# Patient Record
Sex: FEMALE | Race: WHITE
[De-identification: names, ages, dates, MRNs, and addresses within clinical notes are randomized per-mention and may not be internally consistent; named-entity substitution may affect disease eponyms.]

---

## 2023-03-28 ENCOUNTER — HOSPITAL ENCOUNTER (EMERGENCY)
Dept: HOSPITAL 76 - ED | Age: 73
Discharge: HOME | End: 2023-03-28
Payer: SELF-PAY

## 2023-03-28 VITALS — DIASTOLIC BLOOD PRESSURE: 70 MMHG | SYSTOLIC BLOOD PRESSURE: 164 MMHG

## 2023-03-28 DIAGNOSIS — K59.00: Primary | ICD-10-CM

## 2023-03-28 PROCEDURE — 99283 EMERGENCY DEPT VISIT LOW MDM: CPT

## 2023-03-28 PROCEDURE — 99282 EMERGENCY DEPT VISIT SF MDM: CPT

## 2023-03-28 NOTE — ED PHYSICIAN DOCUMENTATION
History of Present Illness





- Stated complaint


Stated Complaint: FEMALE 





- Chief complaint


Chief Complaint: General





- History obtained from


History obtained from: Patient





- History of Present Illness


Pain level max: 0


Pain level now: 0





- Additonal information


Additional information: 





Patient is a 72-year-old female who is visiting from the .  She states she has

not had a bowel movement for several days.  She is not having any pain, did feel

the urge to have a bowel movement but was unable to.  She states that she has 

chronic constipation.  No fevers.  No vomiting.  Nothing makes it better or 

worse.  She states that she has had an extensive work-up for constipation with 

no cause found.





Review of Systems


Constitutional: denies: Fever, Chills


GI: denies: Vomiting, Diarrhea


Skin: denies: Rash


Musculoskeletal: denies: Neck pain, Back pain


Neurologic: denies: Headache





PD PAST MEDICAL HISTORY





- Past Medical History


Past Medical History: Yes





- Past Surgical History


Past Surgical History: Yes


Other past surgical history: brain surgery





- Present Medications


Home Medications: 


                                Ambulatory Orders











 Medication  Instructions  Recorded  Confirmed


 


Methotrexate [Methotrexate Sodium] 15 mg PO Q7D 03/28/23 03/28/23














- Allergies


Allergies/Adverse Reactions: 


                                    Allergies











Allergy/AdvReac Type Severity Reaction Status Date / Time


 


Penicillins Allergy  Hives Verified 03/28/23 15:19














- Living Situation


Living Arrangement: reports: At home





- Social History


Does the pt smoke?: No


Does the pt have substance abuse?: No





- Family History


Family history: reports: Non contributory





PD ED PE NORMAL





- Vitals


Vital signs reviewed: Yes





- General


General: Alert and oriented X 3, No acute distress





- HEENT


HEENT: Moist mucous membranes, Pharynx benign





- Neck


Neck: Supple, no meningeal sign





- Cardiac


Cardiac: RRR





- Respiratory


Respiratory: No respiratory distress, Clear bilaterally





- Abdomen


Abdomen: Soft, Non tender, Non distended





- Rectal


Rectal: Other (firm stool in rectal vault.  Suzanne MONTERROSO present)





- Back


Back: No spinal TTP





- Derm


Derm: Warm and dry





- Neuro


Neuro: Alert and oriented X 3





Results





- Vitals


Vitals: 


                               Vital Signs - 24 hr











  03/28/23 03/28/23 03/28/23





  15:21 16:11 17:19


 


Temperature 36.4 C L  


 


Heart Rate 115 H  


 


Respiratory 18 16 16





Rate   


 


Blood Pressure 164/70 H  


 


O2 Saturation 100  








                                     Oxygen











O2 Source                      Room air

















PD Medical Decision Making





- ED course


Complexity details: re-evaluated patient (Abdomen is soft, nontender 

nondistended on serial exam), considered differential, d/w patient


ED course: 





Patient was given a mineral oil enema followed by a saline enema.  She then had 

a large bowel movement.  All symptoms resolved.  Patient is asymptomatic.  No 

indication for laboratory testing or emergent imaging.  Patient counseled 

regarding signs and symptoms for which I believe and urgent re-evaluation would 

be necessary. Patient with good understanding of and agreement to plan and is 

comfortable going home at this time





This document was made in part using voice recognition software. While efforts 

are made to proofread this document, sound alike and grammatical errors may 

occur.





Departure





- Departure


Disposition: 01 Home, Self Care


Clinical Impression: 


 Acute constipation





Condition: Good


Instructions:  ED Constipation


Follow-Up: 


your,doctor as needed [Other]


Comments: 


Please follow-up with your doctor for further care.  Make sure you drink plenty 

of water.


Discharge Date/Time: 03/28/23 17:34